# Patient Record
Sex: FEMALE | Race: WHITE | NOT HISPANIC OR LATINO
[De-identification: names, ages, dates, MRNs, and addresses within clinical notes are randomized per-mention and may not be internally consistent; named-entity substitution may affect disease eponyms.]

---

## 2017-05-29 PROBLEM — Z00.00 ENCOUNTER FOR PREVENTIVE HEALTH EXAMINATION: Status: ACTIVE | Noted: 2017-05-29

## 2017-06-27 ENCOUNTER — APPOINTMENT (OUTPATIENT)
Dept: CARDIOLOGY | Facility: CLINIC | Age: 72
End: 2017-06-27

## 2018-01-16 ENCOUNTER — APPOINTMENT (OUTPATIENT)
Dept: CARDIOLOGY | Facility: CLINIC | Age: 73
End: 2018-01-16
Payer: MEDICARE

## 2018-01-16 VITALS
DIASTOLIC BLOOD PRESSURE: 62 MMHG | WEIGHT: 174 LBS | HEART RATE: 60 BPM | HEIGHT: 64 IN | SYSTOLIC BLOOD PRESSURE: 122 MMHG | BODY MASS INDEX: 29.71 KG/M2

## 2018-01-16 DIAGNOSIS — A69.20 LYME DISEASE, UNSPECIFIED: ICD-10-CM

## 2018-01-16 DIAGNOSIS — Z78.9 OTHER SPECIFIED HEALTH STATUS: ICD-10-CM

## 2018-01-16 DIAGNOSIS — J40 BRONCHITIS, NOT SPECIFIED AS ACUTE OR CHRONIC: ICD-10-CM

## 2018-01-16 DIAGNOSIS — E11.9 TYPE 2 DIABETES MELLITUS W/OUT COMPLICATIONS: ICD-10-CM

## 2018-01-16 DIAGNOSIS — Z84.89 FAMILY HISTORY OF OTHER SPECIFIED CONDITIONS: ICD-10-CM

## 2018-01-16 PROCEDURE — 99214 OFFICE O/P EST MOD 30 MIN: CPT

## 2018-01-16 RX ORDER — ATORVASTATIN CALCIUM 10 MG/1
10 TABLET, FILM COATED ORAL DAILY
Refills: 0 | Status: ACTIVE | COMMUNITY

## 2018-01-16 RX ORDER — METOPROLOL SUCCINATE 50 MG/1
50 TABLET, EXTENDED RELEASE ORAL DAILY
Refills: 0 | Status: ACTIVE | COMMUNITY

## 2018-01-16 RX ORDER — HYDROCHLOROTHIAZIDE 12.5 MG/1
12.5 TABLET ORAL DAILY
Refills: 0 | Status: ACTIVE | COMMUNITY

## 2018-01-16 RX ORDER — ASPIRIN 81 MG
81 TABLET, DELAYED RELEASE (ENTERIC COATED) ORAL DAILY
Refills: 0 | Status: ACTIVE | COMMUNITY

## 2018-01-16 RX ORDER — RAMIPRIL 5 MG/1
5 CAPSULE ORAL DAILY
Refills: 0 | Status: ACTIVE | COMMUNITY

## 2018-01-16 RX ORDER — METFORMIN HYDROCHLORIDE 1000 MG/1
1000 TABLET, COATED ORAL
Refills: 0 | Status: ACTIVE | COMMUNITY

## 2018-10-08 ENCOUNTER — APPOINTMENT (OUTPATIENT)
Age: 73
End: 2018-10-08
Payer: MEDICARE

## 2018-10-08 VITALS
SYSTOLIC BLOOD PRESSURE: 171 MMHG | WEIGHT: 174 LBS | DIASTOLIC BLOOD PRESSURE: 70 MMHG | BODY MASS INDEX: 29.71 KG/M2 | HEIGHT: 64 IN

## 2018-10-08 DIAGNOSIS — I25.2 OLD MYOCARDIAL INFARCTION: ICD-10-CM

## 2018-10-08 DIAGNOSIS — N81.9 FEMALE GENITAL PROLAPSE, UNSPECIFIED: ICD-10-CM

## 2018-10-08 DIAGNOSIS — Z63.4 DISAPPEARANCE AND DEATH OF FAMILY MEMBER: ICD-10-CM

## 2018-10-08 DIAGNOSIS — Z85.820 PERSONAL HISTORY OF MALIGNANT MELANOMA OF SKIN: ICD-10-CM

## 2018-10-08 DIAGNOSIS — Z87.01 PERSONAL HISTORY OF PNEUMONIA (RECURRENT): ICD-10-CM

## 2018-10-08 DIAGNOSIS — R32 UNSPECIFIED URINARY INCONTINENCE: ICD-10-CM

## 2018-10-08 DIAGNOSIS — R35.1 NOCTURIA: ICD-10-CM

## 2018-10-08 DIAGNOSIS — Z83.3 FAMILY HISTORY OF DIABETES MELLITUS: ICD-10-CM

## 2018-10-08 DIAGNOSIS — Z86.39 PERSONAL HISTORY OF OTHER ENDOCRINE, NUTRITIONAL AND METABOLIC DISEASE: ICD-10-CM

## 2018-10-08 DIAGNOSIS — N81.6 RECTOCELE: ICD-10-CM

## 2018-10-08 DIAGNOSIS — Z82.49 FAMILY HISTORY OF ISCHEMIC HEART DISEASE AND OTHER DISEASES OF THE CIRCULATORY SYSTEM: ICD-10-CM

## 2018-10-08 PROCEDURE — 81003 URINALYSIS AUTO W/O SCOPE: CPT | Mod: QW

## 2018-10-08 PROCEDURE — 99204 OFFICE O/P NEW MOD 45 MIN: CPT | Mod: 25

## 2018-10-08 PROCEDURE — 51701 INSERT BLADDER CATHETER: CPT

## 2018-10-08 SDOH — SOCIAL STABILITY - SOCIAL INSECURITY: DISSAPEARANCE AND DEATH OF FAMILY MEMBER: Z63.4

## 2018-10-09 ENCOUNTER — RESULT REVIEW (OUTPATIENT)
Age: 73
End: 2018-10-09

## 2018-10-09 LAB
APPEARANCE: CLEAR
BACTERIA: NEGATIVE
BILIRUBIN URINE: NEGATIVE
BLOOD URINE: NEGATIVE
COLOR: YELLOW
GLUCOSE QUALITATIVE U: NEGATIVE MG/DL
HYALINE CASTS: 1 /LPF
KETONES URINE: NEGATIVE
LEUKOCYTE ESTERASE URINE: NEGATIVE
MICROSCOPIC-UA: NORMAL
NITRITE URINE: NEGATIVE
PH URINE: 7
PROTEIN URINE: NEGATIVE MG/DL
RED BLOOD CELLS URINE: 0 /HPF
SPECIFIC GRAVITY URINE: 1.01
SQUAMOUS EPITHELIAL CELLS: 1 /HPF
UROBILINOGEN URINE: NEGATIVE MG/DL
WHITE BLOOD CELLS URINE: 0 /HPF

## 2018-10-10 ENCOUNTER — RESULT REVIEW (OUTPATIENT)
Age: 73
End: 2018-10-10

## 2018-10-15 ENCOUNTER — RESULT REVIEW (OUTPATIENT)
Age: 73
End: 2018-10-15

## 2018-10-15 LAB — BACTERIA UR CULT: NORMAL

## 2018-10-19 ENCOUNTER — APPOINTMENT (OUTPATIENT)
Dept: UROGYNECOLOGY | Facility: CLINIC | Age: 73
End: 2018-10-19
Payer: MEDICARE

## 2018-10-19 PROCEDURE — 51797 INTRAABDOMINAL PRESSURE TEST: CPT

## 2018-10-19 PROCEDURE — 51741 ELECTRO-UROFLOWMETRY FIRST: CPT

## 2018-10-19 PROCEDURE — 51784 ANAL/URINARY MUSCLE STUDY: CPT

## 2018-10-19 PROCEDURE — 51729 CYSTOMETROGRAM W/VP&UP: CPT

## 2018-10-29 ENCOUNTER — APPOINTMENT (OUTPATIENT)
Dept: UROGYNECOLOGY | Facility: CLINIC | Age: 73
End: 2018-10-29
Payer: MEDICARE

## 2018-10-29 VITALS
DIASTOLIC BLOOD PRESSURE: 84 MMHG | BODY MASS INDEX: 50.02 KG/M2 | SYSTOLIC BLOOD PRESSURE: 145 MMHG | WEIGHT: 293 LBS | HEIGHT: 64 IN

## 2018-10-29 DIAGNOSIS — N36.42 INTRINSIC SPHINCTER DEFICIENCY (ISD): ICD-10-CM

## 2018-10-29 DIAGNOSIS — N39.3 STRESS INCONTINENCE (FEMALE) (MALE): ICD-10-CM

## 2018-10-29 DIAGNOSIS — N81.11 CYSTOCELE, MIDLINE: ICD-10-CM

## 2018-10-29 DIAGNOSIS — N36.41 HYPERMOBILITY OF URETHRA: ICD-10-CM

## 2018-10-29 DIAGNOSIS — N81.3 COMPLETE UTEROVAGINAL PROLAPSE: ICD-10-CM

## 2018-10-29 PROCEDURE — 99214 OFFICE O/P EST MOD 30 MIN: CPT

## 2018-11-01 ENCOUNTER — RESULT REVIEW (OUTPATIENT)
Age: 73
End: 2018-11-01

## 2018-11-01 LAB — HPV HIGH+LOW RISK DNA PNL CVX: NOT DETECTED

## 2018-11-02 LAB — CYTOLOGY CVX/VAG DOC THIN PREP: NORMAL

## 2018-12-07 ENCOUNTER — NON-APPOINTMENT (OUTPATIENT)
Age: 73
End: 2018-12-07

## 2018-12-07 ENCOUNTER — APPOINTMENT (OUTPATIENT)
Dept: CARDIOLOGY | Facility: CLINIC | Age: 73
End: 2018-12-07
Payer: MEDICARE

## 2018-12-07 VITALS
SYSTOLIC BLOOD PRESSURE: 138 MMHG | OXYGEN SATURATION: 100 % | HEIGHT: 64 IN | DIASTOLIC BLOOD PRESSURE: 70 MMHG | BODY MASS INDEX: 27.83 KG/M2 | HEART RATE: 53 BPM | WEIGHT: 163 LBS

## 2018-12-07 DIAGNOSIS — E78.00 PURE HYPERCHOLESTEROLEMIA, UNSPECIFIED: ICD-10-CM

## 2018-12-07 DIAGNOSIS — I10 ESSENTIAL (PRIMARY) HYPERTENSION: ICD-10-CM

## 2018-12-07 PROCEDURE — 99214 OFFICE O/P EST MOD 30 MIN: CPT

## 2018-12-07 PROCEDURE — 93000 ELECTROCARDIOGRAM COMPLETE: CPT

## 2018-12-07 NOTE — PHYSICAL EXAM
[General Appearance - Well Developed] : well developed [Normal Appearance] : normal appearance [Well Groomed] : well groomed [General Appearance - Well Nourished] : well nourished [No Deformities] : no deformities [General Appearance - In No Acute Distress] : no acute distress [Normal Conjunctiva] : the conjunctiva exhibited no abnormalities [Eyelids - No Xanthelasma] : the eyelids demonstrated no xanthelasmas [Normal Oral Mucosa] : normal oral mucosa [No Oral Pallor] : no oral pallor [No Oral Cyanosis] : no oral cyanosis [Normal Jugular Venous A Waves Present] : normal jugular venous A waves present [Normal Jugular Venous V Waves Present] : normal jugular venous V waves present [No Jugular Venous Flannery A Waves] : no jugular venous flannery A waves [Respiration, Rhythm And Depth] : normal respiratory rhythm and effort [Exaggerated Use Of Accessory Muscles For Inspiration] : no accessory muscle use [Auscultation Breath Sounds / Voice Sounds] : lungs were clear to auscultation bilaterally [Heart Rate And Rhythm] : heart rate and rhythm were normal [Heart Sounds] : normal S1 and S2 [Murmurs] : no murmurs present [Abdomen Soft] : soft [Abdomen Tenderness] : non-tender [Abdomen Mass (___ Cm)] : no abdominal mass palpated [Abnormal Walk] : normal gait [Gait - Sufficient For Exercise Testing] : the gait was sufficient for exercise testing [Nail Clubbing] : no clubbing of the fingernails [Cyanosis, Localized] : no localized cyanosis [Petechial Hemorrhages (___cm)] : no petechial hemorrhages [Skin Color & Pigmentation] : normal skin color and pigmentation [] : no rash [No Venous Stasis] : no venous stasis [Skin Lesions] : no skin lesions [No Skin Ulcers] : no skin ulcer [No Xanthoma] : no  xanthoma was observed [Oriented To Time, Place, And Person] : oriented to person, place, and time [Affect] : the affect was normal [Mood] : the mood was normal [No Anxiety] : not feeling anxious

## 2018-12-19 ENCOUNTER — OUTPATIENT (OUTPATIENT)
Dept: OUTPATIENT SERVICES | Facility: HOSPITAL | Age: 73
LOS: 1 days | End: 2018-12-19
Payer: MEDICARE

## 2018-12-19 VITALS
RESPIRATION RATE: 16 BRPM | HEART RATE: 45 BPM | WEIGHT: 158.73 LBS | HEIGHT: 64 IN | SYSTOLIC BLOOD PRESSURE: 150 MMHG | TEMPERATURE: 97 F | DIASTOLIC BLOOD PRESSURE: 62 MMHG

## 2018-12-19 DIAGNOSIS — C44.91 BASAL CELL CARCINOMA OF SKIN, UNSPECIFIED: Chronic | ICD-10-CM

## 2018-12-19 DIAGNOSIS — Z90.49 ACQUIRED ABSENCE OF OTHER SPECIFIED PARTS OF DIGESTIVE TRACT: Chronic | ICD-10-CM

## 2018-12-19 DIAGNOSIS — Z01.818 ENCOUNTER FOR OTHER PREPROCEDURAL EXAMINATION: ICD-10-CM

## 2018-12-19 DIAGNOSIS — R00.1 BRADYCARDIA, UNSPECIFIED: ICD-10-CM

## 2018-12-19 DIAGNOSIS — N81.11 CYSTOCELE, MIDLINE: ICD-10-CM

## 2018-12-19 DIAGNOSIS — Z29.9 ENCOUNTER FOR PROPHYLACTIC MEASURES, UNSPECIFIED: ICD-10-CM

## 2018-12-19 LAB
ALBUMIN SERPL ELPH-MCNC: 4.4 G/DL — SIGNIFICANT CHANGE UP (ref 3.3–5.2)
ALP SERPL-CCNC: 71 U/L — SIGNIFICANT CHANGE UP (ref 40–120)
ALT FLD-CCNC: 16 U/L — SIGNIFICANT CHANGE UP
ANION GAP SERPL CALC-SCNC: 14 MMOL/L — SIGNIFICANT CHANGE UP (ref 5–17)
APPEARANCE UR: CLEAR — SIGNIFICANT CHANGE UP
APTT BLD: 36.3 SEC — SIGNIFICANT CHANGE UP (ref 27.5–36.3)
AST SERPL-CCNC: 23 U/L — SIGNIFICANT CHANGE UP
BASOPHILS # BLD AUTO: 0 K/UL — SIGNIFICANT CHANGE UP (ref 0–0.2)
BASOPHILS NFR BLD AUTO: 0.6 % — SIGNIFICANT CHANGE UP (ref 0–2)
BILIRUB SERPL-MCNC: 1 MG/DL — SIGNIFICANT CHANGE UP (ref 0.4–2)
BILIRUB UR-MCNC: NEGATIVE — SIGNIFICANT CHANGE UP
BLD GP AB SCN SERPL QL: SIGNIFICANT CHANGE UP
BUN SERPL-MCNC: 20 MG/DL — SIGNIFICANT CHANGE UP (ref 8–20)
CALCIUM SERPL-MCNC: 9.2 MG/DL — SIGNIFICANT CHANGE UP (ref 8.6–10.2)
CHLORIDE SERPL-SCNC: 101 MMOL/L — SIGNIFICANT CHANGE UP (ref 98–107)
CO2 SERPL-SCNC: 27 MMOL/L — SIGNIFICANT CHANGE UP (ref 22–29)
COLOR SPEC: YELLOW — SIGNIFICANT CHANGE UP
CREAT SERPL-MCNC: 0.52 MG/DL — SIGNIFICANT CHANGE UP (ref 0.5–1.3)
DIFF PNL FLD: NEGATIVE — SIGNIFICANT CHANGE UP
EOSINOPHIL # BLD AUTO: 0.6 K/UL — HIGH (ref 0–0.5)
EOSINOPHIL NFR BLD AUTO: 6.7 % — HIGH (ref 0–6)
EPI CELLS # UR: SIGNIFICANT CHANGE UP
GLUCOSE SERPL-MCNC: 85 MG/DL — SIGNIFICANT CHANGE UP (ref 70–115)
GLUCOSE UR QL: NEGATIVE MG/DL — SIGNIFICANT CHANGE UP
HBA1C BLD-MCNC: 5.6 % — SIGNIFICANT CHANGE UP (ref 4–5.6)
HCT VFR BLD CALC: 43.9 % — SIGNIFICANT CHANGE UP (ref 37–47)
HGB BLD-MCNC: 14.6 G/DL — SIGNIFICANT CHANGE UP (ref 12–16)
KETONES UR-MCNC: NEGATIVE — SIGNIFICANT CHANGE UP
LEUKOCYTE ESTERASE UR-ACNC: ABNORMAL
LYMPHOCYTES # BLD AUTO: 1.9 K/UL — SIGNIFICANT CHANGE UP (ref 1–4.8)
LYMPHOCYTES # BLD AUTO: 21.1 % — SIGNIFICANT CHANGE UP (ref 20–55)
MCHC RBC-ENTMCNC: 30.2 PG — SIGNIFICANT CHANGE UP (ref 27–31)
MCHC RBC-ENTMCNC: 33.3 G/DL — SIGNIFICANT CHANGE UP (ref 32–36)
MCV RBC AUTO: 90.9 FL — SIGNIFICANT CHANGE UP (ref 81–99)
MONOCYTES # BLD AUTO: 0.8 K/UL — SIGNIFICANT CHANGE UP (ref 0–0.8)
MONOCYTES NFR BLD AUTO: 8.3 % — SIGNIFICANT CHANGE UP (ref 3–10)
NEUTROPHILS # BLD AUTO: 5.7 K/UL — SIGNIFICANT CHANGE UP (ref 1.8–8)
NEUTROPHILS NFR BLD AUTO: 63.1 % — SIGNIFICANT CHANGE UP (ref 37–73)
NITRITE UR-MCNC: NEGATIVE — SIGNIFICANT CHANGE UP
PH UR: 6.5 — SIGNIFICANT CHANGE UP (ref 5–8)
PLATELET # BLD AUTO: 300 K/UL — SIGNIFICANT CHANGE UP (ref 150–400)
POTASSIUM SERPL-MCNC: 4 MMOL/L — SIGNIFICANT CHANGE UP (ref 3.5–5.3)
POTASSIUM SERPL-SCNC: 4 MMOL/L — SIGNIFICANT CHANGE UP (ref 3.5–5.3)
PROT SERPL-MCNC: 6.8 G/DL — SIGNIFICANT CHANGE UP (ref 6.6–8.7)
PROT UR-MCNC: 15 MG/DL
RBC # BLD: 4.83 M/UL — SIGNIFICANT CHANGE UP (ref 4.4–5.2)
RBC # FLD: 13.8 % — SIGNIFICANT CHANGE UP (ref 11–15.6)
SODIUM SERPL-SCNC: 142 MMOL/L — SIGNIFICANT CHANGE UP (ref 135–145)
SP GR SPEC: 1.01 — SIGNIFICANT CHANGE UP (ref 1.01–1.02)
TYPE + AB SCN PNL BLD: SIGNIFICANT CHANGE UP
UROBILINOGEN FLD QL: NEGATIVE MG/DL — SIGNIFICANT CHANGE UP
WBC # BLD: 9 K/UL — SIGNIFICANT CHANGE UP (ref 4.8–10.8)
WBC # FLD AUTO: 9 K/UL — SIGNIFICANT CHANGE UP (ref 4.8–10.8)
WBC UR QL: SIGNIFICANT CHANGE UP

## 2018-12-19 PROCEDURE — 86850 RBC ANTIBODY SCREEN: CPT

## 2018-12-19 PROCEDURE — 81001 URINALYSIS AUTO W/SCOPE: CPT

## 2018-12-19 PROCEDURE — 71046 X-RAY EXAM CHEST 2 VIEWS: CPT

## 2018-12-19 PROCEDURE — G0463: CPT

## 2018-12-19 PROCEDURE — 85610 PROTHROMBIN TIME: CPT

## 2018-12-19 PROCEDURE — 86901 BLOOD TYPING SEROLOGIC RH(D): CPT

## 2018-12-19 PROCEDURE — 71046 X-RAY EXAM CHEST 2 VIEWS: CPT | Mod: 26

## 2018-12-19 PROCEDURE — 36415 COLL VENOUS BLD VENIPUNCTURE: CPT

## 2018-12-19 PROCEDURE — 85027 COMPLETE CBC AUTOMATED: CPT

## 2018-12-19 PROCEDURE — 85730 THROMBOPLASTIN TIME PARTIAL: CPT

## 2018-12-19 PROCEDURE — 86900 BLOOD TYPING SEROLOGIC ABO: CPT

## 2018-12-19 PROCEDURE — 83036 HEMOGLOBIN GLYCOSYLATED A1C: CPT

## 2018-12-19 PROCEDURE — 87086 URINE CULTURE/COLONY COUNT: CPT

## 2018-12-19 PROCEDURE — 80053 COMPREHEN METABOLIC PANEL: CPT

## 2018-12-19 NOTE — H&P PST ADULT - PMH
Basal cell carcinoma  eye, scalp , forehead  Gallstones    Squamous cell carcinoma  arm Arthritis    Basal cell carcinoma  eye, scalp , forehead  Bradycardia    Cystocele, midline    Gallstones    Spina bifida  occulta  Squamous cell carcinoma  arm

## 2018-12-19 NOTE — H&P PST ADULT - EXTREMITIES COMMENTS
left leg cool to touch pt states from spina bifida poor circulation  color good pedal pulse present with doppler

## 2018-12-19 NOTE — H&P PST ADULT - EKG AND INTERPRETATION
done at cardiology office 12/7 /18   marked sinus bradycardia 49   left anterior fascicular block  non specific t abnormality

## 2018-12-19 NOTE — H&P PST ADULT - PROBLEM SELECTOR PLAN 2
total vaginal hysterectomy bilateral salpingectomy possible oophorectomy uterosacral  suspension, anterior posterior enterocele repair , cystoscopy retropubic midurethral sling

## 2018-12-19 NOTE — H&P PST ADULT - ASSESSMENT
Pleasant 73 year old female with c/o prolapsed bladder presents for hysterectomy with sling procedure.  CAPRINI SCORE [CLOT]    AGE RELATED RISK FACTORS                                                       MOBILITY RELATED FACTORS  [ ] Age 41-60 years                                            (1 Point)                  [ ] Bed rest                                                        (1 Point)  [ X] Age: 61-74 years                                           (2 Points)                 [ ] Plaster cast                                                   (2 Points)  [ ] Age= 75 years                                              (3 Points)                 [ ] Bed bound for more than 72 hours                 (2 Points)    DISEASE RELATED RISK FACTORS                                               GENDER SPECIFIC FACTORS  [ ] Edema in the lower extremities                       (1 Point)                  [ ] Pregnancy                                                     (1 Point)  [ ] Varicose veins                                               (1 Point)                  [ ] Post-partum < 6 weeks                                   (1 Point)             [ ] BMI > 25 Kg/m2                                            (1 Point)                  [ ] Hormonal therapy  or oral contraception          (1 Point)                 [ ] Sepsis (in the previous month)                        (1 Point)                  [ ] History of pregnancy complications                 (1 point)  [ ] Pneumonia or serious lung disease                                               [ ] Unexplained or recurrent                     (1 Point)           (in the previous month)                               (1 Point)  [ ] Abnormal pulmonary function test                     (1 Point)                 SURGERY RELATED RISK FACTORS  [ ] Acute myocardial infarction                              (1 Point)                 [ ]  Section                                             (1 Point)  [ ] Congestive heart failure (in the previous month)  (1 Point)               [ ] Minor surgery                                                  (1 Point)   [ ] Inflammatory bowel disease                             (1 Point)                 [ ] Arthroscopic surgery                                        (2 Points)  [ ] Central venous access                                      (2 Points)                [ X] General surgery lasting more than 45 minutes   (2 Points)       [ ] Stroke (in the previous month)                          (5 Points)               [ ] Elective arthroplasty                                         (5 Points)                                                                                                                                               HEMATOLOGY RELATED FACTORS                                                 TRAUMA RELATED RISK FACTORS  [ ] Prior episodes of VTE                                     (3 Points)                 [ ] Fracture of the hip, pelvis, or leg                       (5 Points)  [ ] Positive family history for VTE                         (3 Points)                 [ ] Acute spinal cord injury (in the previous month)  (5 Points)  [ ] Prothrombin 69112 A                                     (3 Points)                 [ ] Paralysis  (less than 1 month)                             (5 Points)  [ ] Factor V Leiden                                             (3 Points)                  [ ] Multiple Trauma within 1 month                        (5 Points)  [ ] Lupus anticoagulants                                     (3 Points)                                                           [ ] Anticardiolipin antibodies                               (3 Points)                                                       [ ] High homocysteine in the blood                      (3 Points)                                             [ ] Other congenital or acquired thrombophilia      (3 Points)                                                [ ] Heparin induced thrombocytopenia                  (3 Points)                                          Total Score [       4   ]  OPIOID RISK TOOL    RANJEET EACH BOX THAT APPLIES AND ADD TOTALS AT THE END    FAMILY HISTORY OF SUBSTANCE ABUSE                 FEMALE         MALE                                                Alcohol                             [  ]1 pt          [  ]3pts                                               Illegal Durgs                     [  ]2 pts        [  ]3pts                                               Rx Drugs                           [  ]4 pts        [  ]4 pts    PERSONAL HISTORY OF SUBSTANCE ABUSE                                                                                          Alcohol                             [  ]3 pts       [  ]3 pts                                               Illegal Durgs                     [  ]4 pts        [  ]4 pts                                               Rx Drugs                           [  ]5 pts        [  ]5 pts    AGE BETWEEN 16-45 YEARS                                      [  ]1 pt         [  ]1 pt    HISTORY OF PREADOLESCENT   SEXUAL ABUSE                                                             [  ]3 pts        [  ]0pts    PSYCHOLOGICAL DISEASE                     ADD, OCD, Bipolar, Schizophrenia        [  ]2 pts         [  ]2 pts                      Depression                                               [  ]1 pt           [  ]1 pt           SCORING TOTAL   (add numbers and type here)              (**0*)                                     A score of 3 or lower indicated LOW risk for future opiod abuse  A score of 4 to 7 indicated moderate risk for future opiod abuse  A score of 8 or higher indicates a high risk for opiod abuse

## 2018-12-19 NOTE — PATIENT PROFILE ADULT - NSPROEDALEARNPREF_GEN_A_NUR
verbal instruction/written material/Instructed pt on pre-op instructions, tips for safer surgery, pain management scale, pre-surgical infection prevention instructions, medical clearance - pending, take ramipril, metoprolol, HCTZ with a sip of water the morning of surgery, do not take diabetes medication the morning of surgery, call cardiologist/surgeon re: ASA, stop Biotin & Probiotic 7 days prior to surgery, scheduled to get Influenza vaccine on 12/20/18, understanding of all instructions verbalized,/individual instruction

## 2018-12-19 NOTE — H&P PST ADULT - NSANTHOSAYNRD_GEN_A_CORE
No. BRETT screening performed.  STOP BANG Legend: 0-2 = LOW Risk; 3-4 = INTERMEDIATE Risk; 5-8 = HIGH Risk

## 2018-12-19 NOTE — H&P PST ADULT - ATTENDING COMMENTS
patient seen, plan for TVH, USLS, BS, A/P/E, sling, cystoscopy discussed specifically use of [employer] mesh . reviewed postoperative precautions. All questions were answered.

## 2018-12-19 NOTE — H&P PST ADULT - HISTORY OF PRESENT ILLNESS
73 year old female presents with c/o prolapsed bladder stress incontinence and  discomfort . Pessary attempted but uncomfortable . pt is now scheduled for hysterectomy and sling procedure.

## 2018-12-20 LAB
CULTURE RESULTS: SIGNIFICANT CHANGE UP
SPECIMEN SOURCE: SIGNIFICANT CHANGE UP

## 2018-12-27 ENCOUNTER — APPOINTMENT (OUTPATIENT)
Dept: CARDIOLOGY | Facility: CLINIC | Age: 73
End: 2018-12-27
Payer: MEDICARE

## 2018-12-27 PROCEDURE — 93306 TTE W/DOPPLER COMPLETE: CPT

## 2018-12-27 PROCEDURE — 93880 EXTRACRANIAL BILAT STUDY: CPT

## 2018-12-28 ENCOUNTER — APPOINTMENT (OUTPATIENT)
Dept: CARDIOLOGY | Facility: CLINIC | Age: 73
End: 2018-12-28

## 2018-12-28 NOTE — HISTORY OF PRESENT ILLNESS
[FreeTextEntry1] : The patient is presenting today for 6 months cardiac followup visit. The patient is a 73-year-old female with a history of hypertension. Patient has a history of diabetes which is well-controlled. Patient is physically active. Patient denies any symptoms of exertional chest pains or shortness of breath. Patient is physically very active. No symptoms of chest pains or shortness of breath. Patient is preop for hysterectomy.

## 2018-12-28 NOTE — REASON FOR VISIT
[Follow-Up - Clinic] : a clinic follow-up of [Hypertension] : hypertension [FreeTextEntry1] : 6 months

## 2018-12-28 NOTE — ASSESSMENT
[FreeTextEntry1] : Hypertension well controlled\par Borderline diabetes well controlled\par Patient is physically very active. Patient walks daily 2 miles without any symptoms.\par Continue primary prevention.\par ECG done today was reviewed. Normal sinus rhythm with poor R-wave progression. No significant change compared to previous ECG. Patient is clinically doing excellent. The echocardiogram will be scheduled to evaluate ejection fraction and rule out segmental wall motion abnormalities. Carotid ultrasound to evaluate carotid disease and gloved in a progression. Patient is not considering stress test. She's asymptomatic. Followup after further testing. Patient is preop for hysterectomy. Overall she is well compensated in no significant risk for cardiovascular events during or his procedure. Final preop recommendation will be given after reviewing results of echocardiogram and carotid ultrasound.\par Addendum December 28, 2018. Echocardiogram and carotid ultrasound was reviewed. There is no evidence of carotid stenosis. Normal ejection fraction. No evidence of segmental wall motion abnormalities. Overall there is no significant risk for cardiovascular events during anticipated low risk surgery.\par

## 2018-12-31 ENCOUNTER — MESSAGE (OUTPATIENT)
Age: 73
End: 2018-12-31

## 2019-01-01 ENCOUNTER — TRANSCRIPTION ENCOUNTER (OUTPATIENT)
Age: 74
End: 2019-01-01

## 2019-01-02 ENCOUNTER — RESULT REVIEW (OUTPATIENT)
Age: 74
End: 2019-01-02

## 2019-01-02 ENCOUNTER — APPOINTMENT (OUTPATIENT)
Age: 74
End: 2019-01-02
Payer: MEDICARE

## 2019-01-02 ENCOUNTER — OUTPATIENT (OUTPATIENT)
Dept: OUTPATIENT SERVICES | Facility: HOSPITAL | Age: 74
LOS: 1 days | Discharge: HOME | End: 2019-01-02
Payer: MEDICARE

## 2019-01-02 DIAGNOSIS — Z90.49 ACQUIRED ABSENCE OF OTHER SPECIFIED PARTS OF DIGESTIVE TRACT: Chronic | ICD-10-CM

## 2019-01-02 DIAGNOSIS — C44.91 BASAL CELL CARCINOMA OF SKIN, UNSPECIFIED: Chronic | ICD-10-CM

## 2019-01-02 PROCEDURE — 57265 CMBN AP COLPRHY W/NTRCL RPR: CPT | Mod: AS

## 2019-01-02 PROCEDURE — 57288 REPAIR BLADDER DEFECT: CPT | Mod: 80

## 2019-01-02 PROCEDURE — 58260 VAGINAL HYSTERECTOMY: CPT

## 2019-01-02 PROCEDURE — 57288 REPAIR BLADDER DEFECT: CPT

## 2019-01-02 PROCEDURE — 58260 VAGINAL HYSTERECTOMY: CPT | Mod: 80

## 2019-01-02 PROCEDURE — 57265 CMBN AP COLPRHY W/NTRCL RPR: CPT

## 2019-01-02 RX ORDER — UBIDECARENONE 100 MG
0 CAPSULE ORAL
Qty: 0 | Refills: 0 | COMMUNITY

## 2019-01-02 RX ORDER — RAMIPRIL 5 MG
1 CAPSULE ORAL
Qty: 0 | Refills: 0 | COMMUNITY

## 2019-01-02 RX ORDER — METFORMIN HYDROCHLORIDE 850 MG/1
1 TABLET ORAL
Qty: 0 | Refills: 0 | COMMUNITY

## 2019-01-02 RX ORDER — METOPROLOL TARTRATE 50 MG
1 TABLET ORAL
Qty: 0 | Refills: 0 | COMMUNITY

## 2019-01-02 RX ORDER — L.ACIDOPH/B.ANIMALIS/B.LONGUM 15B CELL
1 CAPSULE ORAL
Qty: 0 | Refills: 0 | COMMUNITY

## 2019-01-02 RX ORDER — ATORVASTATIN CALCIUM 80 MG/1
1 TABLET, FILM COATED ORAL
Qty: 0 | Refills: 0 | COMMUNITY

## 2019-01-02 RX ORDER — ASPIRIN/CALCIUM CARB/MAGNESIUM 324 MG
1 TABLET ORAL
Qty: 0 | Refills: 0 | COMMUNITY

## 2019-01-03 ENCOUNTER — OTHER (OUTPATIENT)
Age: 74
End: 2019-01-03

## 2019-01-03 ENCOUNTER — TRANSCRIPTION ENCOUNTER (OUTPATIENT)
Age: 74
End: 2019-01-03

## 2019-01-03 DIAGNOSIS — G89.18 OTHER ACUTE POSTPROCEDURAL PAIN: ICD-10-CM

## 2019-01-03 PROCEDURE — 88342 IMHCHEM/IMCYTCHM 1ST ANTB: CPT

## 2019-01-03 PROCEDURE — C1771: CPT

## 2019-01-03 PROCEDURE — 82962 GLUCOSE BLOOD TEST: CPT

## 2019-01-03 PROCEDURE — 85027 COMPLETE CBC AUTOMATED: CPT

## 2019-01-03 PROCEDURE — 88302 TISSUE EXAM BY PATHOLOGIST: CPT

## 2019-01-03 PROCEDURE — 88341 IMHCHEM/IMCYTCHM EA ADD ANTB: CPT

## 2019-01-03 PROCEDURE — 57288 REPAIR BLADDER DEFECT: CPT

## 2019-01-03 PROCEDURE — 58270 VAG HYST W/ENTEROCELE REPAIR: CPT

## 2019-01-03 PROCEDURE — 88307 TISSUE EXAM BY PATHOLOGIST: CPT

## 2019-01-03 PROCEDURE — 36415 COLL VENOUS BLD VENIPUNCTURE: CPT

## 2019-01-03 PROCEDURE — 80048 BASIC METABOLIC PNL TOTAL CA: CPT

## 2019-01-03 RX ORDER — OXYCODONE AND ACETAMINOPHEN 5; 325 MG/1; MG/1
5-325 TABLET ORAL
Qty: 12 | Refills: 0 | Status: ACTIVE | COMMUNITY
Start: 2019-01-03 | End: 1900-01-01

## 2019-01-09 DIAGNOSIS — N81.2 INCOMPLETE UTEROVAGINAL PROLAPSE: ICD-10-CM

## 2019-01-10 PROBLEM — C44.91 BASAL CELL CARCINOMA OF SKIN, UNSPECIFIED: Chronic | Status: ACTIVE | Noted: 2018-12-19

## 2019-01-10 PROBLEM — R00.1 BRADYCARDIA, UNSPECIFIED: Chronic | Status: ACTIVE | Noted: 2018-12-19

## 2019-01-10 PROBLEM — K80.20 CALCULUS OF GALLBLADDER WITHOUT CHOLECYSTITIS WITHOUT OBSTRUCTION: Chronic | Status: ACTIVE | Noted: 2018-12-19

## 2019-01-10 PROBLEM — M19.90 UNSPECIFIED OSTEOARTHRITIS, UNSPECIFIED SITE: Chronic | Status: ACTIVE | Noted: 2018-12-19

## 2019-01-10 PROBLEM — Q05.9 SPINA BIFIDA, UNSPECIFIED: Chronic | Status: ACTIVE | Noted: 2018-12-19

## 2019-01-10 PROBLEM — N81.11 CYSTOCELE, MIDLINE: Chronic | Status: ACTIVE | Noted: 2018-12-19

## 2019-01-10 PROBLEM — C44.92 SQUAMOUS CELL CARCINOMA OF SKIN, UNSPECIFIED: Chronic | Status: ACTIVE | Noted: 2018-12-19

## 2019-01-15 DIAGNOSIS — E11.9 TYPE 2 DIABETES MELLITUS WITHOUT COMPLICATIONS: ICD-10-CM

## 2019-01-15 DIAGNOSIS — N81.3 COMPLETE UTEROVAGINAL PROLAPSE: ICD-10-CM

## 2019-01-15 DIAGNOSIS — Z79.84 LONG TERM (CURRENT) USE OF ORAL HYPOGLYCEMIC DRUGS: ICD-10-CM

## 2019-01-15 DIAGNOSIS — I10 ESSENTIAL (PRIMARY) HYPERTENSION: ICD-10-CM

## 2019-01-15 DIAGNOSIS — Z79.82 LONG TERM (CURRENT) USE OF ASPIRIN: ICD-10-CM

## 2019-01-18 ENCOUNTER — APPOINTMENT (OUTPATIENT)
Age: 74
End: 2019-01-18
Payer: MEDICARE

## 2019-01-18 PROCEDURE — 99024 POSTOP FOLLOW-UP VISIT: CPT

## 2019-01-18 NOTE — DISCUSSION/SUMMARY
[Post-Op instructions given. 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 Velasco currently reviewing records for an appt\par from a surgical view point- patient feels well, occasional discharge, no prolapse symptoms, no vaginal bleeding, denies incomplete emptying\par reviewed precautions\par f/u in 4 weeks\par I was able to answer all her questions

## 2019-01-18 NOTE — SUBJECTIVE
[FreeTextEntry1] : feels well [FreeTextEntry8] : none [FreeTextEntry7] : mininal [FreeTextEntry6] : normal [FreeTextEntry5] : occasional discharge, unclear if leakage, denies incomplete emptying [FreeTextEntry4] : soft [FreeTextEntry3] : no complaints

## 2019-01-18 NOTE — OBJECTIVE
[Soft and Nontender] : soft and nontender [Clean, Dry, Intact] : Clean, Dry, Intact [Good Support] : Good support [Healing well] : healing well [No Masses or Tenderness] : no masses or tenderness [FreeTextEntry3] : no mesh exposure, positive Vicryl sutures

## 2019-01-31 ENCOUNTER — CLINICAL ADVICE (OUTPATIENT)
Age: 74
End: 2019-01-31

## 2019-02-13 ENCOUNTER — APPOINTMENT (OUTPATIENT)
Age: 74
End: 2019-02-13
Payer: MEDICARE

## 2019-02-13 DIAGNOSIS — Z98.890 OTHER SPECIFIED POSTPROCEDURAL STATES: ICD-10-CM

## 2019-02-13 PROCEDURE — 99024 POSTOP FOLLOW-UP VISIT: CPT

## 2019-02-13 NOTE — SUBJECTIVE
[FreeTextEntry1] : overall feels well, does report vague left sided pain comes and goes sometimes radiates up towards umbilical region, no nausea, no emesis, tolerating diet, passing flatus or stool per vagina, denies vaginal discharge [FreeTextEntry7] : as above [FreeTextEntry6] : normal [FreeTextEntry5] : no complaints [FreeTextEntry4] : soft

## 2019-02-13 NOTE — DISCUSSION/SUMMARY
[Risks/Benefits discussed. Pt/family verbalizes understanding] : risks and benefits of the procedure were discussed with the patient/family who verbalize understanding [FreeTextEntry1] : overall feels well, left sided achy  pain 4/10, no stool/gas per vagina, no vaginal discharge\par rectal exam with no sutures or connection noted to vagina, no signs of fistula on exam, excellent support, no mesh exposure, + sutures at cuff\par CT with air at cuff- no definite fistula- discussed with radiology, CT A/P with rectal contrast to further eval\par message left for patient regarding CT A/P,  no MRI needed at this time- discussed with Dr. Mccoy\par she is seeing Dr. Velasco on Friday for evaluation for endometrial cancer on pathology\par reviewed restrictions, will contact patient after imaging and will f/u in 2 months or sooner as needed. I was able to answer all her quetsions, overall she is very happy with surgery.

## 2019-02-13 NOTE — OBJECTIVE
[Soft and Nontender] : soft and nontender [Clean, Dry, Intact] : Clean, Dry, Intact [Good Support] : Good support [Healing well] : healing well [No Masses or Tenderness] : no masses or tenderness [FreeTextEntry3] : rectal exam with no sutures in rectum, no signs of a fistula, no stool per the vagina

## 2019-04-15 ENCOUNTER — APPOINTMENT (OUTPATIENT)
Dept: UROGYNECOLOGY | Facility: CLINIC | Age: 74
End: 2019-04-15
Payer: MEDICARE

## 2019-04-15 PROCEDURE — 99213 OFFICE O/P EST LOW 20 MIN: CPT

## 2019-04-15 NOTE — DISCUSSION/SUMMARY
[FreeTextEntry1] : \par Griselda presents for followup, she is s/p TVH, USLS, A/P/E repair and sling 1/2 with pathology showing endometrial cancer (FIGO 1, stage 1A). She was evaluated by Dr. Velasco who recommended surveillance with exams every 3 months for 2 years, 6 months for year 3, 4,5 and annual surveillance after year 5. Griselda would like me to do the surveillance. Exam today with no evidence of cancer recurrence. She will return in 3 months or sooner as needed. I was able to answer all her questions.

## 2019-04-15 NOTE — HISTORY OF PRESENT ILLNESS
[FreeTextEntry1] : \par Griselda is s/p TVH, USLS, A/P/E repair and sling 1/2 with pathology showing figo 1 stage 1A endometrial cancer, s/p oncology consult with Dr. Velasco who recommends surveillance.\par \par She is very happy, denies vaginal bulge or prolapse symptoms denies incontinence., no leakage of gas/stool per vagina

## 2019-04-15 NOTE — PHYSICAL EXAM
[No Acute Distress] : in no acute distress [Well developed] : well developed [Tenderness] : ~T no ~M abdominal tenderness observed [Well Nourished] : ~L well nourished [Distended] : not distended [Labia Majora] : were normal [Labia Minora] : were normal [Vulvar Atrophy] : vulvar atrophy [Normal] : was normal [Normal Appearance] : general appearance was normal [Absent] : absent [FreeTextEntry4] : no mesh exposure, no masses or lesions, pds sutures present, [de-identified] : no prolapse

## 2019-07-22 ENCOUNTER — APPOINTMENT (OUTPATIENT)
Dept: UROGYNECOLOGY | Facility: CLINIC | Age: 74
End: 2019-07-22
Payer: MEDICARE

## 2019-07-22 VITALS
WEIGHT: 167.44 LBS | DIASTOLIC BLOOD PRESSURE: 80 MMHG | SYSTOLIC BLOOD PRESSURE: 142 MMHG | HEIGHT: 64 IN | BODY MASS INDEX: 28.59 KG/M2

## 2019-07-22 PROCEDURE — 99213 OFFICE O/P EST LOW 20 MIN: CPT

## 2019-07-22 NOTE — PHYSICAL EXAM
[No Acute Distress] : in no acute distress [Well developed] : well developed [Well Nourished] : ~L well nourished [Tenderness] : ~T no ~M abdominal tenderness observed [Distended] : not distended [Vulvar Atrophy] : vulvar atrophy [Labia Majora] : were normal [Labia Minora] : were normal [Normal Appearance] : general appearance was normal [Normal] : was normal [Absent] : absent [FreeTextEntry4] : no mesh exposure, no masses or lesions [de-identified] : no prolapse

## 2019-07-22 NOTE — HISTORY OF PRESENT ILLNESS
[FreeTextEntry1] : \par Griselda is s/p TVH, USLS, A/P/E repair and sling 1/2 with pathology showing figo 1 stage 1A endometrial cancer, s/p oncology consult with Dr. Velasco who recommends surveillance.\par \par She is very happy, denies vaginal bulge or prolapse symptoms denies incontinence, denies vaginal bleeding

## 2019-10-21 ENCOUNTER — APPOINTMENT (OUTPATIENT)
Dept: UROGYNECOLOGY | Facility: CLINIC | Age: 74
End: 2019-10-21
Payer: MEDICARE

## 2019-10-21 PROCEDURE — 99212 OFFICE O/P EST SF 10 MIN: CPT

## 2019-10-21 NOTE — PHYSICAL EXAM
[No Acute Distress] : in no acute distress [Well developed] : well developed [Well Nourished] : ~L well nourished [Tenderness] : ~T no ~M abdominal tenderness observed [Distended] : not distended [Vulvar Atrophy] : vulvar atrophy [Labia Majora] : were normal [Labia Minora] : were normal [Normal Appearance] : general appearance was normal [Normal] : was normal [Absent] : absent [FreeTextEntry4] : no mesh exposure, no masses or lesions [de-identified] : no prolapse

## 2020-01-27 ENCOUNTER — APPOINTMENT (OUTPATIENT)
Dept: UROGYNECOLOGY | Facility: CLINIC | Age: 75
End: 2020-01-27
Payer: MEDICARE

## 2020-01-27 VITALS — DIASTOLIC BLOOD PRESSURE: 62 MMHG | SYSTOLIC BLOOD PRESSURE: 130 MMHG

## 2020-01-27 PROCEDURE — 99213 OFFICE O/P EST LOW 20 MIN: CPT

## 2020-01-27 NOTE — PHYSICAL EXAM
[No Acute Distress] : in no acute distress [Well developed] : well developed [Well Nourished] : ~L well nourished [Tenderness] : ~T no ~M abdominal tenderness observed [Distended] : not distended [Vulvar Atrophy] : vulvar atrophy [Labia Majora] : were normal [Labia Minora] : were normal [Normal Appearance] : general appearance was normal [Normal] : was normal [Absent] : absent [FreeTextEntry4] : no mesh exposure, no masses or lesions [de-identified] : no prolapse

## 2020-01-27 NOTE — HISTORY OF PRESENT ILLNESS
[FreeTextEntry1] : \par Griselda is s/p TVH, USLS, A/P/E repair and sling 1/2 with pathology showing figo 1 stage 1A endometrial cancer, s/p oncology consult with Dr. Velasco who recommends surveillance, currently q 3 month surveillance until 1/2021\par \par She is continues to be very happy, denies vaginal bulge or prolapse symptoms denies incontinence, denies vaginal bleeding

## 2020-04-20 ENCOUNTER — APPOINTMENT (OUTPATIENT)
Age: 75
End: 2020-04-20
Payer: MEDICARE

## 2020-04-20 VITALS — SYSTOLIC BLOOD PRESSURE: 138 MMHG | DIASTOLIC BLOOD PRESSURE: 80 MMHG

## 2020-04-20 PROCEDURE — 99213 OFFICE O/P EST LOW 20 MIN: CPT

## 2020-04-20 NOTE — HISTORY OF PRESENT ILLNESS
[Cystocele (Obstetric)] : no [Rectal Prolapse] : no [Stress Incontinence] : no [Urge Incontinence Of Urine] : no [Unable To Restrain Bowel Movement] : no [x1] : nocturia once nightly [Vaginal Pain] : no [Pelvic Pain] : no [] : no [FreeTextEntry1] : \par Griselda is s/p TVH, USLS, A/P/E repair and sling 1/2/2019 with pathology showing figo 1 stage 1A endometrial cancer, s/p oncology consult with Dr. Velasco who recommends surveillance, currently q 3 month surveillance until 1/2021\par \par She is continues to be very happy, denies vaginal bulge or prolapse symptoms denies incontinence, denies vaginal bleeding

## 2020-04-20 NOTE — PHYSICAL EXAM
[Well developed] : well developed [No Acute Distress] : in no acute distress [Distended] : not distended [Tenderness] : ~T no ~M abdominal tenderness observed [Well Nourished] : ~L well nourished [Labia Majora] : were normal [Vulvar Atrophy] : vulvar atrophy [Labia Minora] : were normal [Normal] : was normal [Normal Appearance] : general appearance was normal [FreeTextEntry4] : no mesh exposure, no masses or lesions [Absent] : absent [de-identified] : no prolapse

## 2020-04-20 NOTE — DISCUSSION/SUMMARY
[FreeTextEntry1] : \par Griselda presents for followup, she is s/p TVH, USLS, A/P/E repair and sling 1/2/2019 with pathology showing endometrial cancer (FIGO 1, stage 1A). She was evaluated by Dr. Velasco who recommended surveillance with exams every 3 months for 2 years, 6 months for year 3, 4,5 and annual surveillance after year 5. Griselda would like me to do the surveillance. Exam today with no evidence of cancer recurrence. She will return in 3 months or sooner as needed. I was able to answer all her questions.

## 2020-07-20 ENCOUNTER — APPOINTMENT (OUTPATIENT)
Age: 75
End: 2020-07-20
Payer: MEDICARE

## 2020-07-20 DIAGNOSIS — N95.2 POSTMENOPAUSAL ATROPHIC VAGINITIS: ICD-10-CM

## 2020-07-20 PROCEDURE — 99213 OFFICE O/P EST LOW 20 MIN: CPT

## 2020-07-20 NOTE — PHYSICAL EXAM
[Well Nourished] : ~L well nourished [No Acute Distress] : in no acute distress [Well developed] : well developed [Vulvar Atrophy] : vulvar atrophy [Labia Minora] : were normal [Labia Majora] : were normal [Normal] : was normal [Normal Appearance] : general appearance was normal [Absent] : absent [Chaperone Present] : A chaperone was present in the examining room during all aspects of the physical examination [Tenderness] : ~T no ~M abdominal tenderness observed [Distended] : not distended [de-identified] : no prolapse [FreeTextEntry4] : no mesh exposure, no masses or lesions

## 2020-07-20 NOTE — HISTORY OF PRESENT ILLNESS
[Cystocele (Obstetric)] : no [Vaginal Wall Prolapse] : no [Rectal Prolapse] : no [Unable To Restrain Bowel Movement] : no [Urge Incontinence Of Urine] : no [Stress Incontinence] : no [x1] : nocturia once nightly [Pain During Urination (Dysuria)] : no [Incomplete Emptying Of Stool] : no [Pelvic Pain] : no [Constipation Obstructed Defecation] : no [Vulvar Pain] : no [Vaginal Pain] : no [] : no [FreeTextEntry1] : \par Griselda is s/p TVH, USLS, A/P/E repair and sling 1/2/2019 with pathology showing figo 1 stage 1A endometrial cancer, s/p oncology consult with Dr. Velasco who recommends surveillance, currently q 3 month surveillance until 1/2021\par \par She is continues to be very happy, denies vaginal bulge or prolapse symptoms denies incontinence, denies vaginal bleeding

## 2021-01-07 ENCOUNTER — APPOINTMENT (OUTPATIENT)
Age: 76
End: 2021-01-07
Payer: MEDICARE

## 2021-01-07 PROCEDURE — 99213 OFFICE O/P EST LOW 20 MIN: CPT

## 2021-01-07 NOTE — DISCUSSION/SUMMARY
[FreeTextEntry1] : \par Griselda presents for followup, she is s/p TVH, USLS, A/P/E repair and sling 1/2/2019 with pathology showing endometrial cancer (FIGO 1, stage 1A). She was evaluated by Dr. Velasco who recommended surveillance with exams every 3 months for 2 years, 6 months for year 3 , 4,5 (2021,22,23) and annual surveillance after year 5 (starting 2024). Griselda would like me to do the surveillance. Exam today with no evidence of cancer recurrence. She will return in 6 months or sooner as needed. I was able to answer all her questions.

## 2021-01-07 NOTE — HISTORY OF PRESENT ILLNESS
[Cystocele (Obstetric)] : no [Vaginal Wall Prolapse] : no [Rectal Prolapse] : no [Stress Incontinence] : no [Urge Incontinence Of Urine] : no [Unable To Restrain Bowel Movement] : no [x1] : nocturia once nightly [Pain During Urination (Dysuria)] : no [Constipation Obstructed Defecation] : no [] : no [Incomplete Emptying Of Stool] : no [Pelvic Pain] : no [Vaginal Pain] : no [Vulvar Pain] : no [FreeTextEntry1] : \par Griselda is s/p TVH, USLS, A/P/E repair and sling 1/2/2019 with pathology showing figo 1 stage 1A endometrial cancer, s/p oncology consult with Dr. Velasco who recommends surveillance, currently q 3 month surveillance until 1/2021\par \par She is continues to be very happy, denies vaginal bulge or prolapse symptoms denies incontinence, denies vaginal bleeding, occasional urgency\par \par did hurt her back moving

## 2021-01-07 NOTE — PHYSICAL EXAM
[Chaperone Present] : A chaperone was present in the examining room during all aspects of the physical examination [No Acute Distress] : in no acute distress [Well developed] : well developed [Well Nourished] : ~L well nourished [Tenderness] : ~T no ~M abdominal tenderness observed [Distended] : not distended [Vulvar Atrophy] : vulvar atrophy [Labia Majora] : were normal [Labia Minora] : were normal [Normal Appearance] : general appearance was normal [Normal] : was normal [Absent] : absent [FreeTextEntry4] : no mesh exposure, no masses or lesions, no signs of occurrence  [de-identified] : no prolapse

## 2021-07-12 ENCOUNTER — APPOINTMENT (OUTPATIENT)
Age: 76
End: 2021-07-12
Payer: MEDICARE

## 2021-07-12 VITALS
SYSTOLIC BLOOD PRESSURE: 162 MMHG | DIASTOLIC BLOOD PRESSURE: 65 MMHG | BODY MASS INDEX: 28.51 KG/M2 | HEIGHT: 64 IN | WEIGHT: 167 LBS

## 2021-07-12 DIAGNOSIS — Z85.42 ENCOUNTER FOR FOLLOW-UP EXAMINATION AFTER COMPLETED TREATMENT FOR MALIGNANT NEOPLASM: ICD-10-CM

## 2021-07-12 DIAGNOSIS — Z08 ENCOUNTER FOR FOLLOW-UP EXAMINATION AFTER COMPLETED TREATMENT FOR MALIGNANT NEOPLASM: ICD-10-CM

## 2021-07-12 PROCEDURE — 99213 OFFICE O/P EST LOW 20 MIN: CPT

## 2021-07-12 NOTE — HISTORY OF PRESENT ILLNESS
[Cystocele (Obstetric)] : no [Vaginal Wall Prolapse] : no [Rectal Prolapse] : no [Stress Incontinence] : no [Urge Incontinence Of Urine] : no [Unable To Restrain Bowel Movement] : no [x1] : nocturia once nightly [Pain During Urination (Dysuria)] : no [Constipation Obstructed Defecation] : no [] : no [Incomplete Emptying Of Stool] : no [Pelvic Pain] : no [Vaginal Pain] : no [Vulvar Pain] : no [FreeTextEntry1] : \par Griselda is s/p TVH, USLS, A/P/E repair and sling 1/2/2019 with pathology showing figo 1 stage 1A endometrial cancer, s/p oncology consult with Dr. Velasco who recommends surveillance,\par \par She is continues to be very happy, denies vaginal bulge or prolapse symptoms denies incontinence, denies vaginal bleeding, occasional urgency, no change in bowel habits \par

## 2021-07-12 NOTE — PHYSICAL EXAM
[Chaperone Present] : A chaperone was present in the examining room during all aspects of the physical examination [No Acute Distress] : in no acute distress [Well developed] : well developed [Well Nourished] : ~L well nourished [Tenderness] : ~T no ~M abdominal tenderness observed [Distended] : not distended [Vulvar Atrophy] : vulvar atrophy [Labia Majora] : were normal [Labia Minora] : were normal [Normal Appearance] : general appearance was normal [Normal] : was normal [Absent] : absent [FreeTextEntry4] : no mesh exposure, no masses or lesions, no signs of occurrence  [de-identified] : no prolapse

## 2021-07-12 NOTE — DISCUSSION/SUMMARY
[FreeTextEntry1] : \par Griselda presents for followup, she is s/p TVH, USLS, A/P/E repair and sling 1/2/2019 with pathology showing endometrial cancer (FIGO 1, stage 1A). She was evaluated by Dr. Velasco who recommended surveillance with exams every 3 months for 2 years, 6 months for year 3, 4,5 (2021,22,23) and annual surveillance after year 5 (starting 2024). Griselda would like me to do the surveillance. Exam today with no evidence of cancer recurrence, no symptoms. She will return in 6 months or sooner as needed. I was able to answer all her questions.

## 2022-02-16 ENCOUNTER — APPOINTMENT (OUTPATIENT)
Age: 77
End: 2022-02-16
Payer: MEDICARE

## 2022-02-16 PROCEDURE — 99213 OFFICE O/P EST LOW 20 MIN: CPT

## 2022-02-16 NOTE — PHYSICAL EXAM
[Chaperone Present] : A chaperone was present in the examining room during all aspects of the physical examination [No Acute Distress] : in no acute distress [Well developed] : well developed [Well Nourished] : ~L well nourished [Tenderness] : ~T no ~M abdominal tenderness observed [Distended] : not distended [Vulvar Atrophy] : vulvar atrophy [Labia Majora] : were normal [Labia Minora] : were normal [Normal Appearance] : general appearance was normal [Normal] : was normal [Aa ____] : Aa [unfilled] [Ba ____] : Ba [unfilled] [Absent] : absent [FreeTextEntry4] : no mesh exposure, no masses or lesions, no signs of occurrence

## 2022-02-16 NOTE — HISTORY OF PRESENT ILLNESS
[Cystocele (Obstetric)] : no [Vaginal Wall Prolapse] : no [Rectal Prolapse] : no [Stress Incontinence] : no [Urge Incontinence Of Urine] : no [Unable To Restrain Bowel Movement] : mild [x1] : nocturia once nightly [Pain During Urination (Dysuria)] : no [Constipation Obstructed Defecation] : no [] : no [Incomplete Emptying Of Stool] : no [Pelvic Pain] : no [Vaginal Pain] : no [Vulvar Pain] : no [FreeTextEntry1] : \par Griselda is s/p TVH, USLS, A/P/E repair and sling 1/2/2019 with pathology showing figo 1 stage 1A endometrial cancer, s/p oncology consult with Dr. Velasco who recommends surveillance,\par \par She remains to be very happy, denies vaginal bulge or prolapse symptoms rare UUI incontinence, denies vaginal bleeding, occasional urgency, no change in bowel habits \par

## 2022-09-15 ENCOUNTER — APPOINTMENT (OUTPATIENT)
Age: 77
End: 2022-09-15

## 2022-09-15 VITALS — SYSTOLIC BLOOD PRESSURE: 183 MMHG | DIASTOLIC BLOOD PRESSURE: 82 MMHG

## 2022-09-15 DIAGNOSIS — C54.1 MALIGNANT NEOPLASM OF ENDOMETRIUM: ICD-10-CM

## 2022-09-15 PROCEDURE — 99213 OFFICE O/P EST LOW 20 MIN: CPT

## 2022-09-15 NOTE — PHYSICAL EXAM
[Chaperone Present] : A chaperone was present in the examining room during all aspects of the physical examination [No Acute Distress] : in no acute distress [Well developed] : well developed [Well Nourished] : ~L well nourished [Vulvar Atrophy] : vulvar atrophy [Labia Majora] : were normal [Labia Minora] : were normal [Normal Appearance] : general appearance was normal [Normal] : was normal [Aa ____] : Aa [unfilled] [Ba ____] : Ba [unfilled] [Absent] : absent [Tenderness] : ~T no ~M abdominal tenderness observed [Distended] : not distended [FreeTextEntry4] : no mesh exposure, no masses or lesions, no signs of occurrence

## 2022-09-15 NOTE — HISTORY OF PRESENT ILLNESS
[Cystocele (Obstetric)] : no [Vaginal Wall Prolapse] : no [Rectal Prolapse] : no [Stress Incontinence] : no [Urge Incontinence Of Urine] : no [Unable To Restrain Bowel Movement] : mild [x1] : nocturia once nightly [Pain During Urination (Dysuria)] : no [Constipation Obstructed Defecation] : no [Incomplete Emptying Of Stool] : no [Pelvic Pain] : no [Vaginal Pain] : no [Vulvar Pain] : no [] : no [FreeTextEntry1] : \par Griselda is s/p TVH, USLS, A/P/E repair and sling 1/2/2019 with pathology showing figo 1 stage 1A endometrial cancer, s/p oncology consult with Dr. Velasco who recommends surveillance.\par \par no VB, no bulge, rare UUI. \par

## 2022-12-28 ENCOUNTER — OFFICE (OUTPATIENT)
Dept: URBAN - METROPOLITAN AREA CLINIC 8 | Facility: CLINIC | Age: 77
Setting detail: OPHTHALMOLOGY
End: 2022-12-28
Payer: MEDICARE

## 2022-12-28 DIAGNOSIS — H02.831: ICD-10-CM

## 2022-12-28 DIAGNOSIS — H25.13: ICD-10-CM

## 2022-12-28 DIAGNOSIS — E11.9: ICD-10-CM

## 2022-12-28 DIAGNOSIS — H02.834: ICD-10-CM

## 2022-12-28 PROCEDURE — 92004 COMPRE OPH EXAM NEW PT 1/>: CPT | Performed by: OPHTHALMOLOGY

## 2022-12-28 ASSESSMENT — REFRACTION_MANIFEST
OD_AXIS: 020
OD_AXIS: 020
OD_CYLINDER: -0.25
OS_CYLINDER: -1.00
OS_ADD: +2.25
OS_AXIS: 115
OD_SPHERE: -2.25
OS_VA1: 20/30-
OU_VA: 20/25-2
OU_VA: 20/25-2
OS_ADD: +2.25
OS_CYLINDER: -1.00
OD_ADD: +2.25
OD_CYLINDER: -0.25
OD_VA2: 20/25
OD_VA2: 20/25
OS_AXIS: 115
OS_VA1: 20/30-
OS_VA2: 20/25
OD_ADD: +2.25
OD_VA1: 20/25+3
OS_SPHERE: -1.50
OS_SPHERE: -1.50
OD_VA1: 20/25+3
OS_VA2: 20/25
OD_SPHERE: -2.25

## 2022-12-28 ASSESSMENT — SPHEQUIV_DERIVED
OD_SPHEQUIV: -2.375
OS_SPHEQUIV: -2
OD_SPHEQUIV: -2.375
OD_SPHEQUIV: -3.25
OS_SPHEQUIV: -2
OS_SPHEQUIV: -2.25

## 2022-12-28 ASSESSMENT — VISUAL ACUITY
OD_BCVA: 20/50-
OS_BCVA: 20/50+2

## 2022-12-28 ASSESSMENT — AXIALLENGTH_DERIVED
OD_AL: 24.2808
OS_AL: 24.1066
OS_AL: 24.2088
OS_AL: 24.1066
OD_AL: 23.925
OD_AL: 23.925

## 2022-12-28 ASSESSMENT — REFRACTION_CURRENTRX
OD_SPHERE: -3.50
OD_VPRISM_DIRECTION: SV
OD_OVR_VA: 20/
OS_VPRISM_DIRECTION: SV
OS_OVR_VA: 20/
OS_AXIS: 099
OS_CYLINDER: -0.50
OS_SPHERE: -3.25

## 2022-12-28 ASSESSMENT — KERATOMETRY
OS_K2POWER_DIOPTERS: 44.75
OD_AXISANGLE_DEGREES: 093
OD_K1POWER_DIOPTERS: 44.75
OS_K1POWER_DIOPTERS: 43.75
OD_K2POWER_DIOPTERS: 45.50
OS_AXISANGLE_DEGREES: 014

## 2022-12-28 ASSESSMENT — CONFRONTATIONAL VISUAL FIELD TEST (CVF)
OD_FINDINGS: FULL
OS_FINDINGS: FULL

## 2022-12-28 ASSESSMENT — REFRACTION_AUTOREFRACTION
OS_CYLINDER: -1.00
OS_AXIS: 117
OS_SPHERE: -1.75
OD_CYLINDER: -1.50
OD_AXIS: 019
OD_SPHERE: -2.50

## 2022-12-28 ASSESSMENT — LID POSITION - DERMATOCHALASIS
OD_DERMATOCHALASIS: RUL 1+
OS_DERMATOCHALASIS: LUL 1+

## 2023-04-06 ENCOUNTER — APPOINTMENT (OUTPATIENT)
Dept: UROGYNECOLOGY | Facility: CLINIC | Age: 78
End: 2023-04-06
Payer: MEDICARE

## 2023-04-06 VITALS — DIASTOLIC BLOOD PRESSURE: 81 MMHG | SYSTOLIC BLOOD PRESSURE: 163 MMHG

## 2023-04-06 PROCEDURE — 51701 INSERT BLADDER CATHETER: CPT | Mod: 59

## 2023-04-06 PROCEDURE — 99213 OFFICE O/P EST LOW 20 MIN: CPT | Mod: 25

## 2023-04-06 NOTE — HISTORY OF PRESENT ILLNESS
[Cystocele (Obstetric)] : no [Vaginal Wall Prolapse] : no [Rectal Prolapse] : no [Stress Incontinence] : no [Urge Incontinence Of Urine] : no [Unable To Restrain Bowel Movement] : mild [x1] : nocturia once nightly [Pain During Urination (Dysuria)] : no [Constipation Obstructed Defecation] : no [Incomplete Emptying Of Stool] : no [Pelvic Pain] : no [Vaginal Pain] : no [Vulvar Pain] : no [] : no [FreeTextEntry1] : \par Griselda is s/p TVH, USLS, A/P/E repair and sling 1/2/2019 with pathology showing figo 1 stage 1A endometrial cancer, s/p oncology consult with Dr. Velasco who recommends surveillance.\par \par no VB, no bulge, rare UUI. \par reports that she is unsure if she has a UTI as urine has an odor \par

## 2023-04-06 NOTE — DISCUSSION/SUMMARY
[FreeTextEntry1] : \par Griselda presents for followup, she is s/p TVH, USLS, A/P/E repair and sling 1/2/2019 with pathology showing endometrial cancer (FIGO 1, stage 1A). She was evaluated by Dr. Velasco who recommended surveillance with exams every 3 months for 2 years, 6 months for year 3, 4,5 (2021,22,23) and annual surveillance after year 5 (starting 2024). Griselda would like me to do the surveillance. Exam today with no evidence of cancer recurrence, no symptoms. She will return in 6 months or sooner as needed. I was able to answer all her questions.\par \par cath u/a sent today

## 2023-04-06 NOTE — PROCEDURE
[Straight Catheterization] : insertion of a straight catheter [Urinary Frequency] : urinary frequency [Patient] : the patient [___ Fr Straight Tip] : a [unfilled] in [place] straight tip catheter [None] : there were no complications with the catheter insertion [Clear] : clear [No Complications] : no complications [Tolerated Well] : the patient tolerated the procedure well [Post procedure instructions and information given] : Post procedure instructions and information were given and reviewed with patient. [0] : 0

## 2023-04-07 LAB
APPEARANCE: CLEAR
BACTERIA: ABNORMAL /HPF
BILIRUBIN URINE: NEGATIVE
BLOOD URINE: NEGATIVE
COLOR: YELLOW
EPITHELIAL CELLS: 2 /HPF
GLUCOSE QUALITATIVE U: NEGATIVE MG/DL
KETONES URINE: NEGATIVE MG/DL
LEUKOCYTE ESTERASE URINE: ABNORMAL
MICROSCOPIC-UA: NORMAL
NITRITE URINE: POSITIVE
PH URINE: 5.5
PROTEIN URINE: NEGATIVE MG/DL
RED BLOOD CELLS URINE: 0 /HPF
SPECIFIC GRAVITY URINE: 1.01
UROBILINOGEN URINE: 0.2 MG/DL
WHITE BLOOD CELLS URINE: 2 /HPF

## 2023-04-09 ENCOUNTER — NON-APPOINTMENT (OUTPATIENT)
Age: 78
End: 2023-04-09

## 2023-04-10 DIAGNOSIS — N39.0 URINARY TRACT INFECTION, SITE NOT SPECIFIED: ICD-10-CM

## 2023-04-10 RX ORDER — SULFAMETHOXAZOLE AND TRIMETHOPRIM 800; 160 MG/1; MG/1
800-160 TABLET ORAL TWICE DAILY
Qty: 6 | Refills: 0 | Status: ACTIVE | COMMUNITY
Start: 2023-04-10 | End: 1900-01-01

## 2023-04-11 LAB — BACTERIA UR CULT: ABNORMAL

## 2024-01-23 ENCOUNTER — APPOINTMENT (OUTPATIENT)
Dept: UROGYNECOLOGY | Facility: CLINIC | Age: 79
End: 2024-01-23
Payer: MEDICARE

## 2024-01-23 VITALS
DIASTOLIC BLOOD PRESSURE: 73 MMHG | WEIGHT: 198 LBS | SYSTOLIC BLOOD PRESSURE: 159 MMHG | BODY MASS INDEX: 33.8 KG/M2 | HEIGHT: 64 IN

## 2024-01-23 PROCEDURE — 99212 OFFICE O/P EST SF 10 MIN: CPT

## 2024-01-23 PROCEDURE — 99459 PELVIC EXAMINATION: CPT

## 2024-01-23 NOTE — ADDENDUM
[FreeTextEntry1] : This note was written by Viviana Saxena, acting as the  for Dr. Mallory. This note accurately reflects the work and decisions made by Dr. Mallory.

## 2024-01-23 NOTE — DISCUSSION/SUMMARY
[FreeTextEntry1] : Exam today with no evidence of cancer recurrence, no symptoms. She will return in 1 year or sooner as needed. I was able to answer all her questions.

## 2024-01-23 NOTE — HISTORY OF PRESENT ILLNESS
[Cystocele (Obstetric)] : no [Vaginal Wall Prolapse] : no [Rectal Prolapse] : no [Stress Incontinence] : no [Urge Incontinence Of Urine] : no [Unable To Restrain Bowel Movement] : mild [x1] : nocturia once nightly [Pain During Urination (Dysuria)] : no [Constipation Obstructed Defecation] : no [Incomplete Emptying Of Stool] : no [Pelvic Pain] : no [Vaginal Pain] : no [Vulvar Pain] : no [] : no [FreeTextEntry1] : MAGALY is a 78 year female who presents for f/u on endometrial cancer surveillance. Last seen 04/06/2023. s/p TVH, USLS, A/P/E repair and sling 1/2/2019 with pathology showing endometrial cancer (FIGO 1, stage 1A). She was evaluated by Dr. Velasco who recommended surveillance with exams every 3 months for 2 years, 6 months for year 3, 4,5 (2021,22,23) and annual surveillance after year 5 (starting 2024). No VB, no bulge, rare UUI.

## 2024-10-17 NOTE — HISTORY OF PRESENT ILLNESS
Detail Level: Detailed [FreeTextEntry1] : \par Griselda is s/p TVH, USLS, A/P/E repair and sling 1/2 with pathology showing figo 1 stage 1A endometrial cancer, s/p oncology consult with Dr. Velasco who recommends surveillance, currently q 3 month surveillance until 1/2021\par \par She is continues to be very happy, denies vaginal bulge or prolapse symptoms denies incontinence, denies vaginal bleeding
